# Patient Record
(demographics unavailable — no encounter records)

---

## 2025-05-01 NOTE — REVIEW OF SYSTEMS
[Fatigue] : fatigue [Visual Field Defect] : visual field defect [Negative] : Heme/Lymph [FreeTextEntry3] : left eye visual loss

## 2025-05-01 NOTE — HISTORY OF PRESENT ILLNESS
[FreeTextEntry1] : Patient history Hypothyroid, Hashimoto, & Celiac x 3 years 2022. Taking Levothyroxine 75 mcg daily.  Had MRI done yesterday. Noted she has brain tumor. Regional. 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 Has appointment with neurology next week. Complaint of fatigue

## 2025-05-01 NOTE — PAST MEDICAL HISTORY
[Menstruating] : The patient is menstruating [Definite ___ (Date)] : the last menstrual period was [unfilled] [Normal Amount/Duration] : it was of a normal amount and duration [Total Preg ___] : G[unfilled] [Live Births ___] : P[unfilled]  [Full Term ___] : Full Term: [unfilled] [Abortions ___] : Abortions:[unfilled] [Living ___] : Living: [unfilled] [AB Induced ___] : elective abortions: [unfilled]

## 2025-05-01 NOTE — DATA REVIEWED
[No studies available for review at this time.] : No studies available for review at this time. [FreeTextEntry1] : 4/22/25 fariha negative,  RA < 10

## 2025-05-01 NOTE — PHYSICAL EXAM
[Alert] : alert [Well Nourished] : well nourished [Healthy Appearance] : healthy appearance [No Acute Distress] : no acute distress [Well Developed] : well developed [Normal Sclera/Conjunctiva] : normal sclera/conjunctiva [EOMI] : extra ocular movement intact [PERRL] : pupils equal, round and reactive to light [No Proptosis] : no proptosis [Normal Outer Ear/Nose] : the ears and nose were normal in appearance [Normal Hearing] : hearing was normal [Supple] : the neck was supple [Thyroid Not Enlarged] : the thyroid was not enlarged [No Respiratory Distress] : no respiratory distress [No Accessory Muscle Use] : no accessory muscle use [Normal Rate and Effort] : normal respiratory rate and effort [Clear to Auscultation] : lungs were clear to auscultation bilaterally [Normal S1, S2] : normal S1 and S2 [Normal Rate] : heart rate was normal [Regular Rhythm] : with a regular rhythm [Carotids Normal] : carotid pulses were normal with no bruits [No Edema] : no peripheral edema [Pedal Pulses Normal] : the pedal pulses are present [Normal Bowel Sounds] : normal bowel sounds [Not Tender] : non-tender [Not Distended] : not distended [Soft] : abdomen soft [Normal Anterior Cervical Nodes] : no anterior cervical lymphadenopathy [Normal Posterior Cervical Nodes] : no posterior cervical lymphadenopathy [No CVA Tenderness] : no ~M costovertebral angle tenderness [No Spinal Tenderness] : no spinal tenderness [Spine Straight] : spine straight [Kyphosis] : no kyphosis present [Scoliosis] : no scoliosis [No Stigmata of Cushings Syndrome] : no stigmata of Cushings Syndrome [Normal Gait] : normal gait [Normal Strength/Tone] : muscle strength and tone were normal [No Rash] : no rash [Acanthosis Nigricans] : no acanthosis nigricans [No Motor Deficits] : the motor exam was normal [Normal Reflexes] : deep tendon reflexes were 2+ and symmetric [No Tremors] : no tremors [Oriented x3] : oriented to person, place, and time [Normal Affect] : the affect was normal [Normal Mood] : the mood was normal [de-identified] : left peripheral vision decreased- blurred  [de-identified] : Hashimoto, Hypothyroid

## 2025-05-01 NOTE — ASSESSMENT
[Levothyroxine] : The patient was instructed to take Levothyroxine on an empty stomach, separate from vitamins, and wait at least 30 minutes before eating [FreeTextEntry4] : Hashimoto diet [FreeTextEntry1] : 1. Hashimoto diet/ hypothyroid  Levothyroxine 75 mcg daily  2. Cyst within the mid pituitary- large planum sphenoidale mass as dtailed  a meningioma is considered likely. Has neuro appointment surgeon next week.